# Patient Record
Sex: FEMALE | Race: ASIAN | NOT HISPANIC OR LATINO | URBAN - METROPOLITAN AREA
[De-identification: names, ages, dates, MRNs, and addresses within clinical notes are randomized per-mention and may not be internally consistent; named-entity substitution may affect disease eponyms.]

---

## 2017-04-08 ENCOUNTER — EMERGENCY (EMERGENCY)
Facility: HOSPITAL | Age: 22
LOS: 1 days | Discharge: HOME | End: 2017-04-08
Attending: EMERGENCY MEDICINE | Admitting: EMERGENCY MEDICINE
Payer: COMMERCIAL

## 2017-04-08 VITALS
WEIGHT: 164.91 LBS | SYSTOLIC BLOOD PRESSURE: 120 MMHG | HEART RATE: 76 BPM | DIASTOLIC BLOOD PRESSURE: 80 MMHG | RESPIRATION RATE: 18 BRPM | OXYGEN SATURATION: 98 % | HEIGHT: 64 IN | TEMPERATURE: 98 F

## 2017-04-08 DIAGNOSIS — F12.129 CANNABIS ABUSE WITH INTOXICATION, UNSPECIFIED: ICD-10-CM

## 2017-04-08 DIAGNOSIS — R41.82 ALTERED MENTAL STATUS, UNSPECIFIED: ICD-10-CM

## 2017-04-08 DIAGNOSIS — F10.129 ALCOHOL ABUSE WITH INTOXICATION, UNSPECIFIED: ICD-10-CM

## 2017-04-08 PROCEDURE — 99283 EMERGENCY DEPT VISIT LOW MDM: CPT | Mod: 25

## 2017-04-08 PROCEDURE — 99053 MED SERV 10PM-8AM 24 HR FAC: CPT

## 2022-03-31 NOTE — ED ADULT TRIAGE NOTE - TEMPERATURE IN FAHRENHEIT (DEGREES F)
Writer made another PC attempt to reach pt to assist in coordination of her meeting with  to complete forms for medication cost/coverage as well as to schedule follow up with Dr Marsh. No answer. VM left.  
97.7